# Patient Record
Sex: MALE | Race: ASIAN | URBAN - METROPOLITAN AREA
[De-identification: names, ages, dates, MRNs, and addresses within clinical notes are randomized per-mention and may not be internally consistent; named-entity substitution may affect disease eponyms.]

---

## 2024-02-14 ENCOUNTER — OFFICE VISIT (OUTPATIENT)
Dept: FAMILY MEDICINE CLINIC | Facility: CLINIC | Age: 29
End: 2024-02-14
Payer: COMMERCIAL

## 2024-02-14 VITALS
WEIGHT: 315 LBS | TEMPERATURE: 97.2 F | HEIGHT: 76 IN | DIASTOLIC BLOOD PRESSURE: 102 MMHG | SYSTOLIC BLOOD PRESSURE: 144 MMHG | OXYGEN SATURATION: 96 % | HEART RATE: 104 BPM | BODY MASS INDEX: 38.36 KG/M2

## 2024-02-14 DIAGNOSIS — G47.33 OSA (OBSTRUCTIVE SLEEP APNEA): Primary | ICD-10-CM

## 2024-02-14 DIAGNOSIS — Z13.220 SCREENING CHOLESTEROL LEVEL: ICD-10-CM

## 2024-02-14 DIAGNOSIS — R06.81 APNEIC EPISODE: ICD-10-CM

## 2024-02-14 DIAGNOSIS — K21.9 GASTROESOPHAGEAL REFLUX DISEASE WITHOUT ESOPHAGITIS: ICD-10-CM

## 2024-02-14 DIAGNOSIS — R05.3 PERSISTENT COUGH FOR 3 WEEKS OR LONGER: Primary | ICD-10-CM

## 2024-02-14 DIAGNOSIS — Z83.3 FAMILY HISTORY OF DIABETES MELLITUS: ICD-10-CM

## 2024-02-14 DIAGNOSIS — R06.00 PND (PAROXYSMAL NOCTURNAL DYSPNEA): ICD-10-CM

## 2024-02-14 PROCEDURE — 99204 OFFICE O/P NEW MOD 45 MIN: CPT | Performed by: FAMILY MEDICINE

## 2024-02-14 RX ORDER — PANTOPRAZOLE SODIUM 40 MG/1
40 TABLET, DELAYED RELEASE ORAL DAILY
Qty: 30 TABLET | Refills: 2 | Status: SHIPPED | OUTPATIENT
Start: 2024-02-14

## 2024-02-14 RX ORDER — LORATADINE 10 MG/1
10 TABLET ORAL DAILY
Qty: 30 TABLET | Refills: 0 | Status: SHIPPED | OUTPATIENT
Start: 2024-02-14

## 2024-02-14 NOTE — PROGRESS NOTES
Name: Alex Butler      : 1995      MRN: [id number]  Encounter Provider: David Rosenbaum MD  Encounter Date: 2024   Encounter department: [facility]    Assessment & Plan     1. Persistent cough for 3 weeks or longer    2. Apneic episode  -     Ambulatory Referral to Sleep Medicine; Future  -     Comprehensive metabolic panel; Future  -     CBC and differential; Future  -     TSH, 3rd generation with Free T4 reflex; Future    3. BMI 45.0-49.9, adult (HCC)  -     Comprehensive metabolic panel; Future  -     CBC and differential; Future  -     TSH, 3rd generation with Free T4 reflex; Future    4. Screening cholesterol level  -     Lipid panel; Future    5. Family history of diabetes mellitus  -     Hemoglobin A1C; Future    6. Gastroesophageal reflux disease without esophagitis  -     pantoprazole (PROTONIX) 40 mg tablet; Take 1 tablet (40 mg total) by mouth daily    7. PND (paroxysmal nocturnal dyspnea)  -     loratadine (CLARITIN) 10 mg tablet; Take 1 tablet (10 mg total) by mouth daily      Patient with persistent cough, postnasal drip noted, may be secondary to nasal polyp in setting of sleep apnea  Will start patient on Flonase, Protonix, Claritin, Mucinex for persistent cough  Obtain blood work including CBC CMP TSH hemoglobin A1c lipid panel for evaluation  Sleep apnea suspected, refer to sleep medicine for study and consultation  Follow-up in 4 weeks         Subjective     HPI    28-year-old male patient presents to [facility] care.  Patient's main concern today is regarding persistent cough.  Patient is predominantly Chinese speaking, his family members together with him, wife and son.  A portion of history is obtained using Chinese.    Patient reports he has been having a persistent cough for about 6 months, unclear but is concerned about possible COVID at the initial onset of his symptoms.  Reports this is causing some trouble at his job as he is a .  Reports usually  while he is sitting down seems to trigger a bout of coughing.  Denies any history of acid reflux.  No tobacco use.  No diagnosed asthma.    Independently of the cough, patient recently started experiencing severe sore throat.  Reports sharp knifelike pain with swallowing food and water.  Patient does have a productive cough, denies any hemoptysis.  Prior to recently, patient reported he did have a productive cough, mostly his cough is dry.    In addition to this patient describes excessive daytime sleepiness.  Wife describes he does snores loudly and have apneic episodes at night.  Patient does not have a history of diagnosed sleep apnea.      Review of Systems   Constitutional:  Negative for chills and fever.   HENT:  Positive for congestion and sore throat. Negative for rhinorrhea.    Respiratory:  Positive for cough. Negative for chest tightness and shortness of breath.    Cardiovascular:  Negative for chest pain.   Gastrointestinal:  Positive for diarrhea (few days ago) and nausea. Negative for abdominal pain, constipation and vomiting.        Nausea with severe coughing fits   Neurological:  Negative for dizziness, light-headedness and headaches.   Psychiatric/Behavioral:  Positive for sleep disturbance.          Past Medical History:   Diagnosis Date    Gout      History reviewed. No pertinent surgical history.  Family History   Problem Relation Age of Onset    Diabetes Mother     Heart disease Father     Diabetes Father     Diabetes Maternal Grandmother      Social History     Socioeconomic History    Marital status: /Civil Union     Spouse name: None    Number of children: None    Years of education: None    Highest education level: None   Occupational History    None   Tobacco Use    Smoking status: Never    Smokeless tobacco: Never   Vaping Use    Vaping status: Never Used   Substance and Sexual Activity    Alcohol use: Not Currently    Drug use: Never    Sexual activity: None   Other Topics Concern  "   None   Social History Narrative    None     Social Determinants of Health     Financial Resource Strain: Not on file   Food Insecurity: Not on file   Transportation Needs: Not on file   Physical Activity: Not on file   Stress: Not on file   Social Connections: Not on file   Intimate Partner Violence: Not on file   Housing Stability: Not on file     No current outpatient medications on file prior to visit.     No Known Allergies    There is no immunization history on file for this patient.    Objective     BP (!) 144/102 (BP Location: Right arm, Patient Position: Sitting, Cuff Size: Standard)   Pulse 104   Temp (!) 97.2 °F (36.2 °C)   Ht 6' 4\" (1.93 m)   Wt (!) 180 kg (397 lb 9.6 oz)   SpO2 96%   BMI 48.40 kg/m²     Physical Exam  Vitals reviewed.   Constitutional:       General: He is not in acute distress.     Appearance: Normal appearance. He is obese. He is not ill-appearing, toxic-appearing or diaphoretic.   HENT:      Head: Normocephalic.      Nose: Congestion present.      Mouth/Throat:      Comments: Erythematous posterior oral pharynx  Pnd noted   Cardiovascular:      Rate and Rhythm: Normal rate and regular rhythm.      Pulses: Normal pulses.      Heart sounds: Normal heart sounds. No murmur heard.  Pulmonary:      Effort: Pulmonary effort is normal.   Abdominal:      General: Abdomen is flat.   Musculoskeletal:         General: No swelling or deformity.   Skin:     General: Skin is warm and dry.      Capillary Refill: Capillary refill takes less than 2 seconds.      Coloration: Skin is not jaundiced.   Neurological:      General: No focal deficit present.      Mental Status: He is alert.   Psychiatric:         Mood and Affect: Mood normal.         David Rosenbaum MD    "